# Patient Record
Sex: FEMALE | Race: BLACK OR AFRICAN AMERICAN | Employment: FULL TIME | URBAN - METROPOLITAN AREA
[De-identification: names, ages, dates, MRNs, and addresses within clinical notes are randomized per-mention and may not be internally consistent; named-entity substitution may affect disease eponyms.]

---

## 2017-06-06 ENCOUNTER — OFFICE VISIT (OUTPATIENT)
Dept: FAMILY MEDICINE CLINIC | Age: 32
End: 2017-06-06

## 2017-06-06 VITALS
SYSTOLIC BLOOD PRESSURE: 126 MMHG | OXYGEN SATURATION: 98 % | BODY MASS INDEX: 41.3 KG/M2 | WEIGHT: 257 LBS | DIASTOLIC BLOOD PRESSURE: 78 MMHG | HEART RATE: 99 BPM | HEIGHT: 66 IN

## 2017-06-06 DIAGNOSIS — K27.9 PEPTIC ULCER DISEASE: ICD-10-CM

## 2017-06-06 DIAGNOSIS — M25.561 ACUTE PAIN OF RIGHT KNEE: ICD-10-CM

## 2017-06-06 DIAGNOSIS — I10 ESSENTIAL HYPERTENSION: ICD-10-CM

## 2017-06-06 DIAGNOSIS — Z13.31 POSITIVE DEPRESSION SCREENING: ICD-10-CM

## 2017-06-06 DIAGNOSIS — F31.9 BIPOLAR 1 DISORDER (HCC): Primary | ICD-10-CM

## 2017-06-06 PROBLEM — C80.1 CANCER (HCC): Status: RESOLVED | Noted: 2017-06-06 | Resolved: 2017-06-06

## 2017-06-06 PROBLEM — J45.909 ASTHMA: Status: ACTIVE | Noted: 2017-06-06

## 2017-06-06 PROCEDURE — 99203 OFFICE O/P NEW LOW 30 MIN: CPT | Performed by: FAMILY MEDICINE

## 2017-06-06 PROCEDURE — 96160 PT-FOCUSED HLTH RISK ASSMT: CPT | Performed by: FAMILY MEDICINE

## 2017-06-06 PROCEDURE — G8431 POS CLIN DEPRES SCRN F/U DOC: HCPCS | Performed by: FAMILY MEDICINE

## 2017-06-06 RX ORDER — HYDROXYZINE HYDROCHLORIDE 25 MG/1
25 TABLET, FILM COATED ORAL 2 TIMES DAILY
COMMUNITY
End: 2017-06-06 | Stop reason: SDUPTHER

## 2017-06-06 RX ORDER — HYDROCHLOROTHIAZIDE 25 MG/1
25 TABLET ORAL DAILY
Qty: 30 TABLET | Refills: 3 | Status: SHIPPED | OUTPATIENT
Start: 2017-06-06 | End: 2017-10-11

## 2017-06-06 RX ORDER — HYDROXYZINE HYDROCHLORIDE 25 MG/1
25 TABLET, FILM COATED ORAL 2 TIMES DAILY
Qty: 60 TABLET | Refills: 3 | Status: SHIPPED | OUTPATIENT
Start: 2017-06-06 | End: 2017-10-11

## 2017-06-06 RX ORDER — TRAZODONE HYDROCHLORIDE 50 MG/1
50 TABLET ORAL NIGHTLY
Qty: 30 TABLET | Refills: 3 | Status: SHIPPED | OUTPATIENT
Start: 2017-06-06 | End: 2017-10-11

## 2017-06-06 RX ORDER — TRAMADOL HYDROCHLORIDE 50 MG/1
50 TABLET ORAL EVERY 6 HOURS PRN
Qty: 40 TABLET | Refills: 0 | Status: SHIPPED | OUTPATIENT
Start: 2017-06-06 | End: 2017-06-16

## 2017-06-06 RX ORDER — DULOXETIN HYDROCHLORIDE 60 MG/1
60 CAPSULE, DELAYED RELEASE ORAL DAILY
Qty: 30 CAPSULE | Refills: 3 | Status: SHIPPED | OUTPATIENT
Start: 2017-06-06 | End: 2017-10-11

## 2017-06-06 RX ORDER — PANTOPRAZOLE SODIUM 40 MG/1
40 TABLET, DELAYED RELEASE ORAL DAILY
Qty: 30 TABLET | Refills: 3 | Status: SHIPPED | OUTPATIENT
Start: 2017-06-06 | End: 2017-10-11

## 2017-06-06 RX ORDER — DIVALPROEX SODIUM 500 MG/1
500 TABLET, DELAYED RELEASE ORAL 3 TIMES DAILY
Qty: 90 TABLET | Refills: 3 | Status: SHIPPED | OUTPATIENT
Start: 2017-06-06 | End: 2017-10-11

## 2017-06-06 RX ORDER — DIVALPROEX SODIUM 250 MG/1
250 TABLET, DELAYED RELEASE ORAL 3 TIMES DAILY
Qty: 90 TABLET | Refills: 3 | Status: SHIPPED | OUTPATIENT
Start: 2017-06-06 | End: 2017-10-11

## 2017-06-06 ASSESSMENT — ENCOUNTER SYMPTOMS
VOMITING: 0
COUGH: 0
SINUS PRESSURE: 0
SHORTNESS OF BREATH: 0
ABDOMINAL PAIN: 0
BACK PAIN: 0
SORE THROAT: 0
VISUAL CHANGE: 0
NAUSEA: 0
BLOOD IN STOOL: 0
DIARRHEA: 0
EYE DISCHARGE: 0

## 2017-06-06 ASSESSMENT — PATIENT HEALTH QUESTIONNAIRE - PHQ9
4. FEELING TIRED OR HAVING LITTLE ENERGY: 0
7. TROUBLE CONCENTRATING ON THINGS, SUCH AS READING THE NEWSPAPER OR WATCHING TELEVISION: 3
3. TROUBLE FALLING OR STAYING ASLEEP: 3
9. THOUGHTS THAT YOU WOULD BE BETTER OFF DEAD, OR OF HURTING YOURSELF: 0
5. POOR APPETITE OR OVEREATING: 3
8. MOVING OR SPEAKING SO SLOWLY THAT OTHER PEOPLE COULD HAVE NOTICED. OR THE OPPOSITE, BEING SO FIGETY OR RESTLESS THAT YOU HAVE BEEN MOVING AROUND A LOT MORE THAN USUAL: 3
SUM OF ALL RESPONSES TO PHQ QUESTIONS 1-9: 21
6. FEELING BAD ABOUT YOURSELF - OR THAT YOU ARE A FAILURE OR HAVE LET YOURSELF OR YOUR FAMILY DOWN: 3
SUM OF ALL RESPONSES TO PHQ9 QUESTIONS 1 & 2: 6
1. LITTLE INTEREST OR PLEASURE IN DOING THINGS: 3
10. IF YOU CHECKED OFF ANY PROBLEMS, HOW DIFFICULT HAVE THESE PROBLEMS MADE IT FOR YOU TO DO YOUR WORK, TAKE CARE OF THINGS AT HOME, OR GET ALONG WITH OTHER PEOPLE: 3
2. FEELING DOWN, DEPRESSED OR HOPELESS: 3

## 2017-06-30 ENCOUNTER — TELEPHONE (OUTPATIENT)
Dept: FAMILY MEDICINE CLINIC | Age: 32
End: 2017-06-30

## 2017-10-11 ENCOUNTER — OFFICE VISIT (OUTPATIENT)
Dept: FAMILY MEDICINE CLINIC | Age: 32
End: 2017-10-11

## 2017-10-11 VITALS
OXYGEN SATURATION: 97 % | HEIGHT: 65 IN | BODY MASS INDEX: 44.52 KG/M2 | WEIGHT: 267.2 LBS | HEART RATE: 99 BPM | DIASTOLIC BLOOD PRESSURE: 68 MMHG | SYSTOLIC BLOOD PRESSURE: 106 MMHG

## 2017-10-11 DIAGNOSIS — B86 SCABIES: Primary | ICD-10-CM

## 2017-10-11 DIAGNOSIS — K64.9 HEMORRHOIDS, UNSPECIFIED HEMORRHOID TYPE: ICD-10-CM

## 2017-10-11 PROCEDURE — 99213 OFFICE O/P EST LOW 20 MIN: CPT | Performed by: NURSE PRACTITIONER

## 2017-10-11 RX ORDER — IVERMECTIN 3 MG/1
24 TABLET ORAL ONCE
Qty: 8 TABLET | Refills: 1 | Status: SHIPPED | OUTPATIENT
Start: 2017-10-11 | End: 2017-10-11

## 2017-10-11 ASSESSMENT — ENCOUNTER SYMPTOMS
RECTAL PAIN: 1
RESPIRATORY NEGATIVE: 1

## 2017-10-11 NOTE — PROGRESS NOTES
Jessie Hickey is a 27 y.o. female. Chief Complaint   Patient presents with    Rash     x 2 weeks, has been exposed to scabies, worse on creases of arms, face, arms, neck and worse at night. SUBJECTIVE:     MANUELA Delgado presents with pruritus x 2 wks. She is also experiencing hemorrhoids. She was exposed to scabies at her work ~1 month ago. Over the last 2 weeks she reports generalized pruritus that is more intense during the night. The following areas are most bothersome: scalp line, breasts, areolae, waistline, antecubital fossae, wrists, finger webs. She denies new shampoos, soaps, detergents, foods, medications. She is feeling well otherwise. Reports hx of colon CA in 2013 s/p colon resection. Since that time she's had intermittent hemorrhoids. Over the past month she's had worsening pain from her hemorrhoids despite using witch hazel and prep H otc. Requesting referral.    Review of Systems   Constitutional: Negative. HENT: Negative. Respiratory: Negative. Cardiovascular: Negative. Gastrointestinal: Positive for rectal pain (hemorrhoids). Genitourinary: Negative. Musculoskeletal: Negative. Skin: Positive for rash (+ itching). Allergic/Immunologic: Negative for immunocompromised state. Neurological: Negative. Psychiatric/Behavioral: Negative. OBJECTIVE:      /68   Pulse 99   Ht 5' 4.75\" (1.645 m)   Wt 267 lb 3.2 oz (121.2 kg)   LMP 09/13/2017 (Exact Date)   SpO2 97%   Breastfeeding? No   BMI 44.81 kg/m²       Physical Exam   Constitutional: She is oriented to person, place, and time. She appears well-developed and well-nourished. No distress. HENT:   Head: Normocephalic and atraumatic. Pulmonary/Chest: Effort normal.   Musculoskeletal: Normal range of motion. Neurological: She is alert and oriented to person, place, and time. Skin: She is not diaphoretic. Psychiatric: She has a normal mood and affect.  Her behavior is normal. and call your doctor if you are having problems. It's also a good idea to know your test results and keep a list of the medicines you take. How can you care for yourself at home? · Use the lotion or cream your doctor recommends or prescribes. One treatment usually cures scabies. Do not use the cream again unless your doctor tells you to. · Wash all clothes, bedding, and towels that you used in the 3 days before you started treatment. Use hot water, and use the hot cycle in the dryer. Another option is to dry-clean these items. Or seal them in a plastic bag for 3 to 7 days. · Take an oral antihistamine, such as loratadine (Claritin) or diphenhydramine (Benadryl), to help stop itching. You also can use a nonprescription anti-itch cream. Read and follow all instructions on the label. · Do not have physical contact with other people or let anyone use your personal items until you have finished treatment. Do not use other people's personal items until your treatment is done. Tell people with whom you have close contact that they will need treatment if they have symptoms. · Take an oatmeal bath to help relieve itching. Add a handful of oatmeal (ground to a powder) to your bath. Or you can try an oatmeal bath product, such as Aveeno. When should you call for help? Call your doctor now or seek immediate medical care if:  · You have signs of infection, such as:  ¨ Increased pain, swelling, warmth, or redness. ¨ Red streaks leading from the mite bites. ¨ Pus draining from a bite area. ¨ A fever. Watch closely for changes in your health, and be sure to contact your doctor if:  · Anyone else in your family has itching. · You do not get better within 2 weeks. Where can you learn more? Go to [email or web address] and sign in to your [employer] account. Enter P737 in the [employer] box to learn more about \"Scabies: Care Instructions. \"     If you do not have an account, please click on

## 2017-10-13 ENCOUNTER — TELEPHONE (OUTPATIENT)
Dept: FAMILY MEDICINE CLINIC | Age: 32
End: 2017-10-13

## 2017-10-13 DIAGNOSIS — B86 SCABIES: Primary | ICD-10-CM

## 2017-10-16 PROBLEM — B86 SCABIES: Status: ACTIVE | Noted: 2017-10-16

## 2017-10-16 RX ORDER — HYDROXYZINE HYDROCHLORIDE 25 MG/1
25 TABLET, FILM COATED ORAL EVERY 6 HOURS PRN
Qty: 20 TABLET | Refills: 0 | Status: SHIPPED | OUTPATIENT
Start: 2017-10-16 | End: 2017-10-26

## 2017-10-20 ENCOUNTER — OFFICE VISIT (OUTPATIENT)
Dept: FAMILY MEDICINE CLINIC | Age: 32
End: 2017-10-20

## 2017-10-20 VITALS
BODY MASS INDEX: 42.2 KG/M2 | HEIGHT: 66 IN | SYSTOLIC BLOOD PRESSURE: 118 MMHG | OXYGEN SATURATION: 98 % | HEART RATE: 80 BPM | WEIGHT: 262.6 LBS | DIASTOLIC BLOOD PRESSURE: 82 MMHG | RESPIRATION RATE: 17 BRPM

## 2017-10-20 DIAGNOSIS — Z13.1 SCREENING FOR DIABETES MELLITUS: ICD-10-CM

## 2017-10-20 DIAGNOSIS — N92.0 MENORRHAGIA WITH REGULAR CYCLE: Primary | ICD-10-CM

## 2017-10-20 DIAGNOSIS — Z13.220 SCREENING FOR LIPID DISORDERS: ICD-10-CM

## 2017-10-20 DIAGNOSIS — Z13.29 SCREENING FOR THYROID DISORDER: ICD-10-CM

## 2017-10-20 DIAGNOSIS — Z23 NEED FOR PROPHYLACTIC VACCINATION AGAINST DIPHTHERIA-TETANUS-PERTUSSIS (DTP): ICD-10-CM

## 2017-10-20 DIAGNOSIS — Z23 NEED FOR PROPHYLACTIC VACCINATION AGAINST STREPTOCOCCUS PNEUMONIAE (PNEUMOCOCCUS): ICD-10-CM

## 2017-10-20 DIAGNOSIS — Z85.038 HISTORY OF COLON CANCER: ICD-10-CM

## 2017-10-20 DIAGNOSIS — Z13.0 SCREENING FOR DEFICIENCY ANEMIA: ICD-10-CM

## 2017-10-20 PROCEDURE — G8484 FLU IMMUNIZE NO ADMIN: HCPCS | Performed by: NURSE PRACTITIONER

## 2017-10-20 PROCEDURE — G8417 CALC BMI ABV UP PARAM F/U: HCPCS | Performed by: NURSE PRACTITIONER

## 2017-10-20 PROCEDURE — 90472 IMMUNIZATION ADMIN EACH ADD: CPT | Performed by: NURSE PRACTITIONER

## 2017-10-20 PROCEDURE — 1036F TOBACCO NON-USER: CPT | Performed by: NURSE PRACTITIONER

## 2017-10-20 PROCEDURE — 90715 TDAP VACCINE 7 YRS/> IM: CPT | Performed by: NURSE PRACTITIONER

## 2017-10-20 PROCEDURE — 90471 IMMUNIZATION ADMIN: CPT | Performed by: NURSE PRACTITIONER

## 2017-10-20 PROCEDURE — 99215 OFFICE O/P EST HI 40 MIN: CPT | Performed by: NURSE PRACTITIONER

## 2017-10-20 PROCEDURE — 90732 PPSV23 VACC 2 YRS+ SUBQ/IM: CPT | Performed by: NURSE PRACTITIONER

## 2017-10-20 PROCEDURE — G8427 DOCREV CUR MEDS BY ELIG CLIN: HCPCS | Performed by: NURSE PRACTITIONER

## 2017-10-20 RX ORDER — DIVALPROEX SODIUM 500 MG/1
TABLET, DELAYED RELEASE ORAL
Refills: 0 | COMMUNITY
Start: 2017-10-16 | End: 2017-10-20 | Stop reason: SDUPTHER

## 2017-10-20 RX ORDER — IVERMECTIN 3 MG/1
TABLET ORAL
Refills: 0 | COMMUNITY
Start: 2017-10-16 | End: 2017-10-20 | Stop reason: ALTCHOICE

## 2017-10-20 RX ORDER — DULOXETIN HYDROCHLORIDE 60 MG/1
CAPSULE, DELAYED RELEASE ORAL
Qty: 30 CAPSULE | Refills: 5 | Status: SHIPPED | OUTPATIENT
Start: 2017-10-20 | End: 2017-10-20 | Stop reason: CLARIF

## 2017-10-20 RX ORDER — HYDROCHLOROTHIAZIDE 25 MG/1
TABLET ORAL
Refills: 0 | COMMUNITY
Start: 2017-10-16 | End: 2017-10-20 | Stop reason: SDUPTHER

## 2017-10-20 RX ORDER — LURASIDONE HYDROCHLORIDE 40 MG/1
40 TABLET, FILM COATED ORAL DAILY
Qty: 30 TABLET | Refills: 5 | Status: SHIPPED | OUTPATIENT
Start: 2017-10-20 | End: 2017-10-20 | Stop reason: CLARIF

## 2017-10-20 RX ORDER — ALBUTEROL SULFATE 90 UG/1
2 AEROSOL, METERED RESPIRATORY (INHALATION) EVERY 6 HOURS PRN
Qty: 1 INHALER | Refills: 3 | Status: SHIPPED | OUTPATIENT
Start: 2017-10-20

## 2017-10-20 RX ORDER — LURASIDONE HYDROCHLORIDE 40 MG/1
TABLET, FILM COATED ORAL
COMMUNITY
Start: 2017-10-19 | End: 2017-10-20 | Stop reason: SDUPTHER

## 2017-10-20 RX ORDER — PANTOPRAZOLE SODIUM 40 MG/1
TABLET, DELAYED RELEASE ORAL
Refills: 0 | COMMUNITY
Start: 2017-10-16 | End: 2017-10-20 | Stop reason: SDUPTHER

## 2017-10-20 RX ORDER — DULOXETIN HYDROCHLORIDE 60 MG/1
CAPSULE, DELAYED RELEASE ORAL
Refills: 0 | COMMUNITY
Start: 2017-10-16 | End: 2017-10-20 | Stop reason: SDUPTHER

## 2017-10-20 RX ORDER — HYDROCHLOROTHIAZIDE 25 MG/1
TABLET ORAL
Qty: 30 TABLET | Refills: 5 | Status: SHIPPED | OUTPATIENT
Start: 2017-10-20 | End: 2022-02-02

## 2017-10-20 RX ORDER — DIVALPROEX SODIUM 500 MG/1
TABLET, DELAYED RELEASE ORAL
Qty: 90 TABLET | Refills: 5 | Status: SHIPPED | OUTPATIENT
Start: 2017-10-20 | End: 2017-10-20 | Stop reason: CLARIF

## 2017-10-20 RX ORDER — PANTOPRAZOLE SODIUM 40 MG/1
TABLET, DELAYED RELEASE ORAL
Qty: 30 TABLET | Refills: 5 | Status: SHIPPED | OUTPATIENT
Start: 2017-10-20 | End: 2022-02-02

## 2017-10-20 NOTE — PROGRESS NOTES
SUBJECTIVE:  Jessie Hickey   1985   female   No Known Allergies    Chief Complaint   Patient presents with    [facility]      HPI   Here to [facility] PCP  Moved here from [place]  Reports that she has a history of colon cancer. 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    Hypertension     Kidney stone     Peptic ulcer disease      Social History     Social History    Marital status: Single     Spouse name: N/A    Number of children: 3    Years of education: 15     Occupational History    [facility]; [place]     Social History Main Topics    Smoking status: Passive Smoke Exposure - Never Smoker    Smokeless tobacco: Never Used    Alcohol use Yes      Comment: coffee 1/day; ETOH oncre monthly    Drug use: No    Sexual activity: Not Currently     Other Topics Concern    Not on file     Social History Narrative    No history of family violence. Yes, she feels safe in her home. No gun in the home.           Family History   Problem Relation Age of Onset    Arthritis Mother     Asthma Mother     Heart Failure Mother     High Cholesterol Mother     High Blood Pressure Mother     Migraines Mother     Cancer Mother      possible lung but not sure    Heart Attack Mother     Arthritis Maternal Grandmother     High Blood Pressure Maternal Grandmother     Breast Cancer Maternal Grandmother     Cancer Maternal Grandmother      brest, lung and bone    Schizophrenia Sister     Bipolar Disorder Daughter     Asthma Daughter     Asthma Son     ADHD Son      oppositional defiant disorder    Asthma Son     No Known Problems Grandchild     Diabetes Neg Hx     Stroke Neg Hx      Past Surgical History:   Procedure

## 2017-10-22 ASSESSMENT — ENCOUNTER SYMPTOMS
NAUSEA: 0
SHORTNESS OF BREATH: 0
ABDOMINAL DISTENTION: 0
VOMITING: 0
BACK PAIN: 0

## 2017-10-25 ENCOUNTER — NURSE ONLY (OUTPATIENT)
Dept: FAMILY MEDICINE CLINIC | Age: 32
End: 2017-10-25

## 2017-10-25 DIAGNOSIS — Z13.0 SCREENING FOR DEFICIENCY ANEMIA: ICD-10-CM

## 2017-10-25 DIAGNOSIS — Z13.1 SCREENING FOR DIABETES MELLITUS: ICD-10-CM

## 2017-10-25 DIAGNOSIS — Z13.29 SCREENING FOR THYROID DISORDER: ICD-10-CM

## 2017-10-25 DIAGNOSIS — Z13.220 SCREENING FOR LIPID DISORDERS: ICD-10-CM

## 2017-10-25 LAB
A/G RATIO: 1 (ref 1.1–2.2)
ALBUMIN SERPL-MCNC: 3.7 G/DL (ref 3.4–5)
ALP BLD-CCNC: 51 U/L (ref 40–129)
ALT SERPL-CCNC: 12 U/L (ref 10–40)
ANION GAP SERPL CALCULATED.3IONS-SCNC: 10 MMOL/L (ref 3–16)
AST SERPL-CCNC: 16 U/L (ref 15–37)
BILIRUB SERPL-MCNC: <0.2 MG/DL (ref 0–1)
BUN BLDV-MCNC: 9 MG/DL (ref 7–20)
CALCIUM SERPL-MCNC: 9.5 MG/DL (ref 8.3–10.6)
CHLORIDE BLD-SCNC: 103 MMOL/L (ref 99–110)
CHOLESTEROL, TOTAL: 167 MG/DL (ref 0–199)
CO2: 28 MMOL/L (ref 21–32)
CREAT SERPL-MCNC: 0.7 MG/DL (ref 0.6–1.1)
GFR AFRICAN AMERICAN: >60
GFR NON-AFRICAN AMERICAN: >60
GLOBULIN: 3.8 G/DL
GLUCOSE BLD-MCNC: 83 MG/DL (ref 70–99)
HCT VFR BLD CALC: 34.3 % (ref 36–48)
HDLC SERPL-MCNC: 52 MG/DL (ref 40–60)
HEMOGLOBIN: 10.7 G/DL (ref 12–16)
LDL CHOLESTEROL CALCULATED: 91 MG/DL
MCH RBC QN AUTO: 28.9 PG (ref 26–34)
MCHC RBC AUTO-ENTMCNC: 31.2 G/DL (ref 31–36)
MCV RBC AUTO: 92.5 FL (ref 80–100)
PDW BLD-RTO: 17.5 % (ref 12.4–15.4)
PLATELET # BLD: 322 K/UL (ref 135–450)
PMV BLD AUTO: 7 FL (ref 5–10.5)
POTASSIUM SERPL-SCNC: 4.8 MMOL/L (ref 3.5–5.1)
RBC # BLD: 3.71 M/UL (ref 4–5.2)
SODIUM BLD-SCNC: 141 MMOL/L (ref 136–145)
TOTAL PROTEIN: 7.5 G/DL (ref 6.4–8.2)
TRIGL SERPL-MCNC: 118 MG/DL (ref 0–150)
TSH REFLEX FT4: 2.38 UIU/ML (ref 0.27–4.2)
VLDLC SERPL CALC-MCNC: 24 MG/DL
WBC # BLD: 3.9 K/UL (ref 4–11)

## 2017-10-25 PROCEDURE — 36415 COLL VENOUS BLD VENIPUNCTURE: CPT | Performed by: NURSE PRACTITIONER

## 2017-10-26 RX ORDER — LANOLIN ALCOHOL/MO/W.PET/CERES
325 CREAM (GRAM) TOPICAL 2 TIMES DAILY
Qty: 60 TABLET | Refills: 2 | Status: SHIPPED | OUTPATIENT
Start: 2017-10-26 | End: 2022-02-02

## 2017-10-31 ENCOUNTER — HOSPITAL ENCOUNTER (OUTPATIENT)
Dept: ULTRASOUND IMAGING | Age: 32
Discharge: HOME | End: 2017-10-31
Attending: INTERNAL MEDICINE | Admitting: INTERNAL MEDICINE

## 2017-10-31 DIAGNOSIS — R10.11 ABDOMINAL PAIN, RIGHT UPPER QUADRANT: ICD-10-CM

## 2017-10-31 DIAGNOSIS — R10.31 ABDOMINAL PAIN, RIGHT LOWER QUADRANT: ICD-10-CM

## 2018-01-18 ENCOUNTER — HOSPITAL ENCOUNTER (OUTPATIENT)
Dept: OTHER | Age: 33
Discharge: HOME | End: 2018-01-18
Attending: OBSTETRICS & GYNECOLOGY | Admitting: OBSTETRICS & GYNECOLOGY

## 2018-03-19 ENCOUNTER — TELEPHONE (OUTPATIENT)
Dept: FAMILY MEDICINE CLINIC | Age: 33
End: 2018-03-19

## 2018-03-19 NOTE — TELEPHONE ENCOUNTER
Called patient at home/Cell,  no answer LM on Vm requesting patient call the office to reschedule this appt

## 2019-02-27 ENCOUNTER — TELEPHONE (OUTPATIENT)
Dept: ORTHOPEDIC SURGERY | Age: 34
End: 2019-02-27

## 2020-03-03 ENCOUNTER — TELEPHONE (OUTPATIENT)
Dept: FAMILY MEDICINE CLINIC | Age: 35
End: 2020-03-03

## 2021-01-20 ENCOUNTER — HOSPITAL ENCOUNTER (OUTPATIENT)
Dept: INFUSION THERAPY | Age: 36
Discharge: HOME | End: 2021-01-20
Payer: COMMERCIAL

## 2021-01-20 ENCOUNTER — INITIAL CONSULT (OUTPATIENT)
Dept: ONCOLOGY | Age: 36
End: 2021-01-20
Payer: COMMERCIAL

## 2021-01-20 VITALS
TEMPERATURE: 97.7 F | HEIGHT: 65 IN | DIASTOLIC BLOOD PRESSURE: 90 MMHG | SYSTOLIC BLOOD PRESSURE: 136 MMHG | WEIGHT: 271 LBS | BODY MASS INDEX: 45.15 KG/M2 | OXYGEN SATURATION: 98 % | RESPIRATION RATE: 16 BRPM | HEART RATE: 96 BPM

## 2021-01-20 DIAGNOSIS — D3A.020 CARCINOID TUMOR OF APPENDIX, UNSPECIFIED WHETHER MALIGNANT: ICD-10-CM

## 2021-01-20 DIAGNOSIS — D3A.020 CARCINOID TUMOR OF APPENDIX, UNSPECIFIED WHETHER MALIGNANT: Primary | ICD-10-CM

## 2021-01-20 LAB
ALBUMIN SERPL-MCNC: 4.3 GM/DL (ref 3.4–5)
ALP BLD-CCNC: 62 IU/L (ref 40–128)
ALT SERPL-CCNC: 22 U/L (ref 10–40)
ANION GAP SERPL CALCULATED.3IONS-SCNC: 9 MMOL/L (ref 4–16)
AST SERPL-CCNC: 22 IU/L (ref 15–37)
BASOPHILS ABSOLUTE: 0 K/CU MM
BASOPHILS RELATIVE PERCENT: 0.2 % (ref 0–1)
BILIRUB SERPL-MCNC: 0.2 MG/DL (ref 0–1)
BUN BLDV-MCNC: 11 MG/DL (ref 6–23)
CALCIUM SERPL-MCNC: 9.1 MG/DL (ref 8.3–10.6)
CHLORIDE BLD-SCNC: 106 MMOL/L (ref 99–110)
CO2: 24 MMOL/L (ref 21–32)
CREAT SERPL-MCNC: 0.7 MG/DL (ref 0.6–1.1)
DIFFERENTIAL TYPE: ABNORMAL
EOSINOPHILS ABSOLUTE: 0.2 K/CU MM
EOSINOPHILS RELATIVE PERCENT: 3.8 % (ref 0–3)
GFR AFRICAN AMERICAN: >60 ML/MIN/1.73M2
GFR NON-AFRICAN AMERICAN: >60 ML/MIN/1.73M2
GLUCOSE BLD-MCNC: 93 MG/DL (ref 70–99)
HCT VFR BLD CALC: 37.1 % (ref 37–47)
HEMOGLOBIN: 12.5 GM/DL (ref 12.5–16)
LYMPHOCYTES ABSOLUTE: 2 K/CU MM
LYMPHOCYTES RELATIVE PERCENT: 38 % (ref 24–44)
MCH RBC QN AUTO: 30.4 PG (ref 27–31)
MCHC RBC AUTO-ENTMCNC: 33.7 % (ref 32–36)
MCV RBC AUTO: 90.3 FL (ref 78–100)
MONOCYTES ABSOLUTE: 0.5 K/CU MM
MONOCYTES RELATIVE PERCENT: 8.9 % (ref 0–4)
PDW BLD-RTO: 13 % (ref 11.7–14.9)
PLATELET # BLD: 277 K/CU MM (ref 140–440)
PMV BLD AUTO: 8.3 FL (ref 7.5–11.1)
POTASSIUM SERPL-SCNC: 4.1 MMOL/L (ref 3.5–5.1)
RBC # BLD: 4.11 M/CU MM (ref 4.2–5.4)
SEGMENTED NEUTROPHILS ABSOLUTE COUNT: 2.6 K/CU MM
SEGMENTED NEUTROPHILS RELATIVE PERCENT: 49.1 % (ref 36–66)
SODIUM BLD-SCNC: 139 MMOL/L (ref 135–145)
TOTAL PROTEIN: 7.6 GM/DL (ref 6.4–8.2)
WBC # BLD: 5.3 K/CU MM (ref 4–10.5)

## 2021-01-20 PROCEDURE — G8427 DOCREV CUR MEDS BY ELIG CLIN: HCPCS | Performed by: INTERNAL MEDICINE

## 2021-01-20 PROCEDURE — 85025 COMPLETE CBC W/AUTO DIFF WBC: CPT

## 2021-01-20 PROCEDURE — 80053 COMPREHEN METABOLIC PANEL: CPT

## 2021-01-20 PROCEDURE — G8417 CALC BMI ABV UP PARAM F/U: HCPCS | Performed by: INTERNAL MEDICINE

## 2021-01-20 PROCEDURE — 1036F TOBACCO NON-USER: CPT | Performed by: INTERNAL MEDICINE

## 2021-01-20 PROCEDURE — 86316 IMMUNOASSAY TUMOR OTHER: CPT

## 2021-01-20 PROCEDURE — 99202 OFFICE O/P NEW SF 15 MIN: CPT

## 2021-01-20 PROCEDURE — 36415 COLL VENOUS BLD VENIPUNCTURE: CPT

## 2021-01-20 PROCEDURE — G8484 FLU IMMUNIZE NO ADMIN: HCPCS | Performed by: INTERNAL MEDICINE

## 2021-01-20 PROCEDURE — 99204 OFFICE O/P NEW MOD 45 MIN: CPT | Performed by: INTERNAL MEDICINE

## 2021-01-20 RX ORDER — PROPRANOLOL HYDROCHLORIDE 20 MG/1
20 TABLET ORAL 2 TIMES DAILY
COMMUNITY
Start: 2021-01-05 | End: 2022-02-23

## 2021-01-20 RX ORDER — IBUPROFEN 600 MG/1
TABLET ORAL
COMMUNITY
End: 2022-02-23

## 2021-01-20 RX ORDER — HYDROXYZINE HYDROCHLORIDE 25 MG/1
TABLET, FILM COATED ORAL
COMMUNITY
Start: 2020-10-21

## 2021-01-20 RX ORDER — PENICILLIN V POTASSIUM 500 MG/1
TABLET ORAL
COMMUNITY
End: 2022-02-02

## 2021-01-20 RX ORDER — AZITHROMYCIN 500 MG/1
TABLET, FILM COATED ORAL
COMMUNITY
End: 2022-02-02

## 2021-01-20 RX ORDER — NITROFURANTOIN 25; 75 MG/1; MG/1
CAPSULE ORAL
COMMUNITY
End: 2022-02-02

## 2021-01-20 RX ORDER — TOLTERODINE 2 MG/1
2 CAPSULE, EXTENDED RELEASE ORAL DAILY
COMMUNITY
Start: 2020-06-19 | End: 2022-02-02

## 2021-01-20 RX ORDER — CEFUROXIME AXETIL 500 MG/1
TABLET ORAL
COMMUNITY
Start: 2020-06-26 | End: 2022-02-02

## 2021-01-20 RX ORDER — ARIPIPRAZOLE 2 MG/1
TABLET ORAL
COMMUNITY
End: 2022-02-02

## 2021-01-20 RX ORDER — SUMATRIPTAN 25 MG/1
TABLET, FILM COATED ORAL
COMMUNITY
Start: 2020-10-17 | End: 2022-02-02

## 2021-01-20 RX ORDER — PREDNISONE 20 MG/1
TABLET ORAL
COMMUNITY
Start: 2020-06-26 | End: 2022-02-23

## 2021-01-20 RX ORDER — AZITHROMYCIN 250 MG/1
TABLET, FILM COATED ORAL
COMMUNITY
End: 2022-02-02

## 2021-01-20 RX ORDER — TRAZODONE HYDROCHLORIDE 50 MG/1
TABLET ORAL
COMMUNITY
Start: 2021-01-05 | End: 2022-02-02

## 2021-01-20 RX ORDER — ACETAMINOPHEN AND CODEINE PHOSPHATE 300; 30 MG/1; MG/1
TABLET ORAL
COMMUNITY
End: 2022-02-02

## 2021-01-20 RX ORDER — SULFAMETHOXAZOLE AND TRIMETHOPRIM 800; 160 MG/1; MG/1
TABLET ORAL
COMMUNITY
End: 2022-02-02

## 2021-01-20 RX ORDER — LAMOTRIGINE 100 MG/1
TABLET ORAL
COMMUNITY

## 2021-01-20 RX ORDER — BISMUTH SUBCITRATE POTASSIUM, METRONIDAZOLE, TETRACYCLINE HYDROCHLORIDE 140; 125; 125 MG/1; MG/1; MG/1
CAPSULE ORAL
COMMUNITY
Start: 2021-01-13 | End: 2022-02-02

## 2021-01-20 RX ORDER — METRONIDAZOLE 500 MG/1
TABLET ORAL
COMMUNITY
End: 2022-02-02

## 2021-01-20 RX ORDER — AMLODIPINE BESYLATE 5 MG/1
TABLET ORAL
COMMUNITY
Start: 2020-05-26

## 2021-01-20 RX ORDER — FLUCONAZOLE 150 MG/1
TABLET ORAL
COMMUNITY
End: 2022-02-23

## 2021-01-20 RX ORDER — HYDROCODONE BITARTRATE AND ACETAMINOPHEN 7.5; 325 MG/1; MG/1
TABLET ORAL
COMMUNITY
End: 2022-02-02

## 2021-01-20 RX ORDER — CLONAZEPAM 0.5 MG/1
0.5 TABLET ORAL NIGHTLY
COMMUNITY
Start: 2020-11-01 | End: 2022-02-02

## 2021-01-20 RX ORDER — CLARITHROMYCIN 500 MG/1
TABLET, COATED ORAL
COMMUNITY
End: 2022-02-02

## 2021-01-20 RX ORDER — ZOLPIDEM TARTRATE 5 MG/1
TABLET ORAL
COMMUNITY
Start: 2020-12-01 | End: 2022-02-02

## 2021-01-20 RX ORDER — OXYBUTYNIN CHLORIDE 5 MG/1
5 TABLET ORAL EVERY 8 HOURS PRN
COMMUNITY
Start: 2020-06-22 | End: 2022-02-02

## 2021-01-20 ASSESSMENT — PATIENT HEALTH QUESTIONNAIRE - PHQ9
7. TROUBLE CONCENTRATING ON THINGS, SUCH AS READING THE NEWSPAPER OR WATCHING TELEVISION: 2
SUM OF ALL RESPONSES TO PHQ QUESTIONS 1-9: 16
SUM OF ALL RESPONSES TO PHQ QUESTIONS 1-9: 16
2. FEELING DOWN, DEPRESSED OR HOPELESS: 2
SUM OF ALL RESPONSES TO PHQ9 QUESTIONS 1 & 2: 3
8. MOVING OR SPEAKING SO SLOWLY THAT OTHER PEOPLE COULD HAVE NOTICED. OR THE OPPOSITE, BEING SO FIGETY OR RESTLESS THAT YOU HAVE BEEN MOVING AROUND A LOT MORE THAN USUAL: 0
1. LITTLE INTEREST OR PLEASURE IN DOING THINGS: 1
3. TROUBLE FALLING OR STAYING ASLEEP: 3
5. POOR APPETITE OR OVEREATING: 3
6. FEELING BAD ABOUT YOURSELF - OR THAT YOU ARE A FAILURE OR HAVE LET YOURSELF OR YOUR FAMILY DOWN: 2
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
## 2021-01-20 NOTE — PROGRESS NOTES
MA Rooming Questions  Patient: Almas Freeman  MRN: [id number]    Date: 1/20/2021         New patient      5. Did the patient have a depression screening completed today?  Yes    PHQ-9 Total Score: 16 (1/20/2021  3:38 PM)  Thoughts that you would be better off dead, or of hurting yourself in some way: 0 (1/20/2021  3:38 PM)       PHQ-9 Given to (if applicable):               PHQ-9 Score (if applicable):                     [] Positive     []  Negative              Does question #9 need addressed (if applicable)                     [] Yes    []  No               Kat Zelaya MA

## 2021-01-21 ENCOUNTER — HOSPITAL ENCOUNTER (OUTPATIENT)
Age: 36
Setting detail: SPECIMEN
Discharge: HOME | End: 2021-01-21
Payer: COMMERCIAL

## 2021-01-21 PROCEDURE — 83497 ASSAY OF 5-HIAA: CPT

## 2021-01-21 PROCEDURE — 81050 URINALYSIS VOLUME MEASURE: CPT

## 2021-01-22 LAB
Lab: 24 HRS
VOLUME, (UVOL): 1400 MLS

## 2021-01-24 LAB — CHROMOGRANIN A: 128 NG/ML (ref 0–103)

## 2021-01-25 LAB
5 HIAA URINE (PER GM CREAT): 3 MG/GCR (ref 0–14)
5-HIAA 24 HOUR URINE: 5 MG/D (ref 0–15)
5-HIAA INTERPRETATION: ABNORMAL
5-HIAA URINE: 3.3 MG/L
CREATININE URINE: 116 MG/DL
CREATININE, 24H UR: 1624 MG/D (ref 700–1600)
TIME URINE: 24
VOLUME, (UVOL): 1400

## 2021-02-01 ENCOUNTER — TELEPHONE (OUTPATIENT)
Dept: ONCOLOGY | Age: 36
End: 2021-02-01

## 2021-02-09 ENCOUNTER — TELEPHONE (OUTPATIENT)
Dept: ONCOLOGY | Age: 36
End: 2021-02-09
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
## 2021-02-15 ENCOUNTER — OFFICE VISIT (OUTPATIENT)
Dept: ONCOLOGY | Age: 36
End: 2021-02-15
Payer: COMMERCIAL

## 2021-02-15 DIAGNOSIS — D3A.020 CARCINOID TUMOR OF APPENDIX, UNSPECIFIED WHETHER MALIGNANT: Primary | ICD-10-CM

## 2021-02-15 PROCEDURE — 99422 OL DIG E/M SVC 11-20 MIN: CPT | Performed by: INTERNAL MEDICINE

## 2021-10-11 ENCOUNTER — OFFICE VISIT (OUTPATIENT)
Dept: OBGYN | Age: 36
End: 2021-10-11
Payer: COMMERCIAL

## 2021-10-11 VITALS
WEIGHT: 273 LBS | HEART RATE: 96 BPM | DIASTOLIC BLOOD PRESSURE: 96 MMHG | HEIGHT: 65 IN | SYSTOLIC BLOOD PRESSURE: 130 MMHG | BODY MASS INDEX: 45.48 KG/M2

## 2021-10-11 DIAGNOSIS — Z01.419 WOMEN'S ANNUAL ROUTINE GYNECOLOGICAL EXAMINATION: Primary | ICD-10-CM

## 2021-10-11 DIAGNOSIS — N93.9 VAGINAL BLEEDING: ICD-10-CM

## 2021-10-11 DIAGNOSIS — N61.1 BREAST ABSCESS: ICD-10-CM

## 2021-10-11 PROCEDURE — 99213 OFFICE O/P EST LOW 20 MIN: CPT | Performed by: OBSTETRICS & GYNECOLOGY

## 2021-10-11 PROCEDURE — 99395 PREV VISIT EST AGE 18-39: CPT | Performed by: OBSTETRICS & GYNECOLOGY

## 2021-10-11 RX ORDER — SULFAMETHOXAZOLE AND TRIMETHOPRIM 800; 160 MG/1; MG/1
1 TABLET ORAL 2 TIMES DAILY
Qty: 20 TABLET | Refills: 0 | Status: SHIPPED | OUTPATIENT
Start: 2021-10-11 | End: 2021-10-21

## 2021-10-11 SDOH — ECONOMIC STABILITY: FOOD INSECURITY: WITHIN THE PAST 12 MONTHS, YOU WORRIED THAT YOUR FOOD WOULD RUN OUT BEFORE YOU GOT MONEY TO BUY MORE.: NEVER TRUE

## 2021-10-11 SDOH — ECONOMIC STABILITY: FOOD INSECURITY: WITHIN THE PAST 12 MONTHS, THE FOOD YOU BOUGHT JUST DIDN'T LAST AND YOU DIDN'T HAVE MONEY TO GET MORE.: NEVER TRUE

## 2021-10-11 ASSESSMENT — SOCIAL DETERMINANTS OF HEALTH (SDOH): HOW HARD IS IT FOR YOU TO PAY FOR THE VERY BASICS LIKE FOOD, HOUSING, MEDICAL CARE, AND HEATING?: NOT HARD AT ALL

## 2021-10-11 NOTE — PROGRESS NOTES
10/11/21    Kirill Angel  1985    Chief Complaint   Patient presents with    Gynecologic Exam     pt had hyster, c/o light orange vaginal bleeding on and off x 6 months. left breast, neck and chest pain x 2 wks, mild-sharp, hot flashes and night sweats x 1 yr last pap 2027.  Breast Pain        Kirill Angel is a 43 y.o. female who presents today for evaluation of annual examination. She reported vaginal bleeding when she began working out but it is resolved now. She also reports left breast pain.     Past Medical History:   Diagnosis Date    Anxiety     Asthma     [facility]     Bipolar 2 disorder ([facility]     Breast pain, left     Cancer ([facility]     colon- carcinoid tumor in colon and Appendix    Colon tumor     Ectopic pregnancy     Elective      GERD (gastroesophageal reflux disease)     Hot flashes     Hypertension     IBS (irritable bowel syndrome)     Kidney stone     Kidney stone     Menorrhagia     Migraine     Neck pain     Night sweat     Obesity     Peptic ulcer disease     Trichomonosis     Vaginal bleeding        Past Surgical History:   Procedure Laterality Date    APPENDECTOMY      HYSTERECTOMY, TOTAL ABDOMINAL      KIDNEY STONE SURGERY      OVARY REMOVAL      SALPINGECTOMY      right    SUBTOTAL COLECTOMY      WRIST GANGLION EXCISION         Social History     Tobacco Use    Smoking status: Passive Smoke Exposure - Never Smoker    Smokeless tobacco: Never Used   Vaping Use    Vaping Use: Never assessed   Substance Use Topics    Alcohol use: Yes     Comment: coffee 1/day; ETOH oncre monthly    Drug use: No       Family History   Problem Relation Age of Onset    Arthritis Mother     Asthma Mother     Heart Failure Mother     High Cholesterol Mother     High Blood Pressure Mother     Migraines Mother     Cancer Mother         possible lung but not sure    Heart Attack Mother     Alcohol Abuse Mother    Kathy Frazier hours as needed      tolterodine (DETROL LA) 2 MG extended release capsule Take 2 mg by mouth daily      clonazePAM (KLONOPIN) 0.5 MG tablet Take 0.5 mg by mouth nightly.  lamoTRIgine (LAMICTAL) 100 MG tablet lamotrigine 100 mg tablet      acetaminophen-codeine (TYLENOL #3) 300-30 MG per tablet acetaminophen 300 mg-codeine 30 mg tablet      HYDROcodone-acetaminophen (NORCO) 7.5-325 MG per tablet hydrocodone 7.5 mg-acetaminophen 325 mg tablet      ibuprofen (ADVIL;MOTRIN) 600 MG tablet ibuprofen 600 mg tablet      SUMAtriptan (IMITREX) 25 MG tablet take 1 tablet by mouth twice a day      amLODIPine (NORVASC) 5 MG tablet amlodipine 5 mg tablet      propranolol (INDERAL) 20 MG tablet Take 20 mg by mouth 2 times daily      PYLERA 140-125-125 MG per capsule       ferrous sulfate (FE TABS) 325 (65 Fe) MG EC tablet Take 1 tablet by mouth 2 times daily 60 tablet 2    hydrochlorothiazide (HYDRODIURIL) 25 MG tablet take 1 tablet by mouth once daily 30 tablet 5    pantoprazole (PROTONIX) 40 MG tablet take 1 tablet by mouth once daily 30 tablet 5    albuterol sulfate HFA (PROAIR HFA) 108 (90 Base) MCG/ACT inhaler Inhale 2 puffs into the lungs every 6 hours as needed for Wheezing 1 Inhaler 3    naproxen (NAPROSYN) 250 MG tablet Take 1 tablet by mouth 2 times daily as needed for Pain 20 tablet 0     No current facility-administered medications for this visit. Allergies   Allergen Reactions    Red Dye Rash    Triptans Itching    Allyl Isothiocyanate Hives and Swelling    Levonorgestrel-Ethinyl [facility]        [id number]    Immunization History   Administered Date(s) Administered    Pneumococcal Polysaccharide ([id number]) 10/20/2017    Tdap (Boostrix, Adacel) 10/20/2017       Review of Systems   All other systems reviewed and are negative. BP (!) 130/96   Pulse 96   Ht 5' 5\" (1.651 m)   Wt 273 lb (123.8 kg)   LMP 12/13/2017   BMI 45.43 kg/m²     Physical Exam  Nursing note reviewed.  Exam conducted with a chaperone present. HENT:      Head: Normocephalic. Nose: Nose normal.   Eyes:      Extraocular Movements: Extraocular movements intact. Pulmonary:      Effort: Pulmonary effort is normal.   Abdominal:      General: Abdomen is flat. Palpations: Abdomen is soft. Hernia: There is no hernia in the left inguinal area or right inguinal area. Genitourinary:     General: Normal vulva. Exam position: Lithotomy position. Pubic Area: No rash or pubic lice. Labia:         Right: No rash, tenderness, lesion or injury. Left: No rash, tenderness, lesion or injury. Urethra: No prolapse, urethral pain, urethral swelling or urethral lesion. Vagina: Normal.      Cervix: Normal.      Uterus: Normal.       Adnexa: Right adnexa normal and left adnexa normal.      Rectum: Normal.   Musculoskeletal:      Cervical back: Normal range of motion. Lymphadenopathy:      Lower Body: No right inguinal adenopathy. No left inguinal adenopathy. Skin:     General: Skin is warm. Neurological:      Mental Status: She is alert. No results found for this visit on 10/11/21. ASSESSMENT AND PLAN   Diagnosis Orders   1. Women's annual routine gynecological examination     2. Vaginal bleeding     3. Breast abscess     The patient will monitor her vaginal symptoms. If she has recurrent vaginal bleeding she will return for another appointment. She will use Tylenol and warm compresses to the area of her last left breast.  Bactrim twice daily. She will return in 1 week for repeat examination. If she continues to have symptoms and no improvement we will proceed with breast ultrasound. Return in about 1 week (around 10/18/2021) for follow up appointment.     Patricia Martinez MD

## 2021-10-12 LAB
C TRACH DNA GENITAL QL NAA+PROBE: NEGATIVE
CANDIDA SPECIES, DNA PROBE: ABNORMAL
GARDNERELLA VAGINALIS, DNA PROBE: ABNORMAL
N. GONORRHOEAE DNA: NEGATIVE
TRICHOMONAS VAGINALIS DNA: ABNORMAL

## 2021-10-13 RX ORDER — METRONIDAZOLE 500 MG/1
500 TABLET ORAL 2 TIMES DAILY
Qty: 14 TABLET | Refills: 0 | Status: SHIPPED | OUTPATIENT
Start: 2021-10-13 | End: 2021-10-20

## 2021-10-18 ENCOUNTER — OFFICE VISIT (OUTPATIENT)
Dept: OBGYN | Age: 36
End: 2021-10-18
Payer: COMMERCIAL

## 2021-10-18 VITALS
DIASTOLIC BLOOD PRESSURE: 90 MMHG | HEART RATE: 100 BPM | BODY MASS INDEX: 46.15 KG/M2 | SYSTOLIC BLOOD PRESSURE: 132 MMHG | HEIGHT: 65 IN | WEIGHT: 277 LBS

## 2021-10-18 DIAGNOSIS — N39.46 MIXED INCONTINENCE: ICD-10-CM

## 2021-10-18 DIAGNOSIS — N61.1 BREAST ABSCESS: Primary | ICD-10-CM

## 2021-10-18 DIAGNOSIS — N95.1 MENOPAUSAL SYMPTOM: ICD-10-CM

## 2021-10-18 PROCEDURE — 99214 OFFICE O/P EST MOD 30 MIN: CPT | Performed by: OBSTETRICS & GYNECOLOGY

## 2021-10-18 RX ORDER — OXYBUTYNIN CHLORIDE 10 MG/1
10 TABLET, EXTENDED RELEASE ORAL DAILY
Qty: 30 TABLET | Refills: 3 | Status: SHIPPED | OUTPATIENT
Start: 2021-10-18 | End: 2022-02-02

## 2021-10-18 RX ORDER — ESTRADIOL 0.5 MG/1
0.5 TABLET ORAL DAILY
Qty: 21 TABLET | Refills: 11 | Status: SHIPPED | OUTPATIENT
Start: 2021-10-18

## 2021-10-18 NOTE — PROGRESS NOTES
10/18/21    Gwendolyn Evnas  1985    Chief Complaint   Patient presents with    Follow-up     pt here for f/u on left breast pain and abscess. states the swelling has went down, continues to have left breast lump . states finished bactrim. Gwendolyn Evans is a 43 y.o. female who presents today for evaluation of as above. She also complains of menopausal symptoms with hot flashes and night sweats. Also complains of mixed incontinence symptoms with urgency seeming to be the predominant one.     Past Medical History:   Diagnosis Date    Anxiety     Asthma     [facility]     Bipolar 2 disorder ([facility]     Breast abscess     Breast pain, left     [facility]    colon- carcinoid tumor in colon and Appendix    Colon tumor     Ectopic pregnancy     Elective      GERD (gastroesophageal reflux disease)     Hot flashes     Hypertension     IBS (irritable bowel syndrome)     Kidney stone     Kidney stone     Menorrhagia     Migraine     Neck pain     Night sweat     Obesity     Peptic ulcer disease     Trichomonosis     Vaginal bleeding        Past Surgical History:   Procedure Laterality Date    APPENDECTOMY      HYSTERECTOMY, TOTAL ABDOMINAL      KIDNEY STONE SURGERY      OVARY REMOVAL      SALPINGECTOMY      right    SUBTOTAL COLECTOMY      WRIST GANGLION EXCISION         Social History     Tobacco Use    Smoking status: Passive Smoke Exposure - Never Smoker    Smokeless tobacco: Never Used   Vaping Use    Vaping Use: Never used   Substance Use Topics    Alcohol use: Yes     Comment: coffee 1/day; ETOH oncre monthly    Drug use: No       Family History   Problem Relation Age of Onset    Arthritis Mother     Asthma Mother     Heart Failure Mother     High Cholesterol Mother     High Blood Pressure Mother     Migraines Mother     Cancer Mother         possible lung but not sure    Heart Attack Mother     Alcohol Abuse Mother    Ring Depression Mother     Mental Illness Mother     Arthritis Maternal Grandmother     High Blood Pressure Maternal Grandmother     Breast Cancer Maternal Grandmother     Cancer Maternal Grandmother         brest, lung and bone    Diabetes Father     Schizophrenia Sister     Bipolar Disorder Daughter     Asthma Daughter     Asthma Son     ADHD Son         oppositional defiant disorder    Asthma Son     No Known Problems Grandchild     Alcohol Abuse Paternal Grandmother     Stroke Neg Hx        Current Outpatient Medications   Medication Sig Dispense Refill    estradiol (ESTRACE) 0.5 MG tablet Take 1 tablet by mouth daily 21 tablet 11    oxybutynin (DITROPAN XL) 10 MG extended release tablet Take 1 tablet by mouth daily 30 tablet 3    metroNIDAZOLE (FLAGYL) 500 MG tablet Take 1 tablet by mouth 2 times daily for 7 days 14 tablet 0    sulfamethoxazole-trimethoprim (BACTRIM DS) 800-160 MG per tablet Take 1 tablet by mouth 2 times daily for 10 days 20 tablet 0    MULTIPLE VITAMINS-CALCIUM PO Take by mouth daily      beclomethasone (QVAR REDIHALER) 40 MCG/ACT AERB inhaler Qvar RediHaler 40 mcg/actuation HFA breath activated aerosol      azithromycin (ZITHROMAX) 250 MG tablet azithromycin 250 mg tablet      azithromycin (ZITHROMAX) 500 MG tablet azithromycin 500 mg tablet      cefUROXime (CEFTIN) 500 MG tablet One twice daily with food      clarithromycin (BIAXIN) 500 MG tablet clarithromycin 500 mg tablet      fluconazole (DIFLUCAN) 150 MG tablet fluconazole 150 mg tablet      metroNIDAZOLE (FLAGYL) 500 MG tablet metronidazole 500 mg tablet      nitrofurantoin, macrocrystal-monohydrate, (MACROBID) 100 MG capsule nitrofurantoin monohydrate/macrocrystals 100 mg capsule      penicillin v potassium (VEETID) 500 MG tablet penicillin V potassium 500 mg tablet      sulfamethoxazole-trimethoprim (BACTRIM DS;SEPTRA DS) 800-160 MG per tablet sulfamethoxazole 800 mg-trimethoprim 160 mg tablet      ARIPiprazole (ABILIFY) 2 MG tablet aripiprazole 2 mg tablet      hydrOXYzine (ATARAX) 25 MG tablet hydroxyzine HCl 25 mg tablet      traZODone (DESYREL) 50 MG tablet take 1 tablet by mouth at bedtime      zolpidem (AMBIEN) 5 MG tablet take 1 tablet by mouth at bedtime if needed      predniSONE (DELTASONE) 20 MG tablet prednisone 20 mg tablet      oxybutynin (DITROPAN) 5 MG tablet Take 5 mg by mouth every 8 hours as needed      tolterodine (DETROL LA) 2 MG extended release capsule Take 2 mg by mouth daily      clonazePAM (KLONOPIN) 0.5 MG tablet Take 0.5 mg by mouth nightly.  lamoTRIgine (LAMICTAL) 100 MG tablet lamotrigine 100 mg tablet      acetaminophen-codeine (TYLENOL #3) 300-30 MG per tablet acetaminophen 300 mg-codeine 30 mg tablet      HYDROcodone-acetaminophen (NORCO) 7.5-325 MG per tablet hydrocodone 7.5 mg-acetaminophen 325 mg tablet      ibuprofen (ADVIL;MOTRIN) 600 MG tablet ibuprofen 600 mg tablet      SUMAtriptan (IMITREX) 25 MG tablet take 1 tablet by mouth twice a day      amLODIPine (NORVASC) 5 MG tablet amlodipine 5 mg tablet      propranolol (INDERAL) 20 MG tablet Take 20 mg by mouth 2 times daily      PYLERA 140-125-125 MG per capsule       naproxen (NAPROSYN) 250 MG tablet Take 1 tablet by mouth 2 times daily as needed for Pain 20 tablet 0    ferrous sulfate (FE TABS) 325 (65 Fe) MG EC tablet Take 1 tablet by mouth 2 times daily 60 tablet 2    hydrochlorothiazide (HYDRODIURIL) 25 MG tablet take 1 tablet by mouth once daily 30 tablet 5    pantoprazole (PROTONIX) 40 MG tablet take 1 tablet by mouth once daily 30 tablet 5    albuterol sulfate HFA (PROAIR HFA) 108 (90 Base) MCG/ACT inhaler Inhale 2 puffs into the lungs every 6 hours as needed for Wheezing 1 Inhaler 3     No current facility-administered medications for this visit.        Allergies   Allergen Reactions    Red Dye Rash    Triptans Itching    Allyl Isothiocyanate Hives and Swelling    Levonorgestrel-Ethinyl Meenakshi Quick        [id number]    Immunization History   Administered Date(s) Administered    Pneumococcal Polysaccharide ([id number]) 10/20/2017    Tdap (Boostrix, Adacel) 10/20/2017       Review of Systems   All other systems reviewed and are negative. BP (!) 132/90   Pulse 100   Ht 5' 5\" (1.651 m)   Wt 277 lb (125.6 kg)   LMP 12/13/2017   BMI 46.10 kg/m²     Physical Exam    No results found for this visit on 10/18/21. ASSESSMENT AND PLAN   Diagnosis Orders   1. Breast abscess     2. Menopausal symptom  estradiol (ESTRACE) 0.5 MG tablet   3. Mixed incontinence  oxybutynin (DITROPAN XL) 10 MG extended release tablet   The patient will monitor breast symptoms call or return if they worsen. She will continue with warm compresses. The region is much less indurated and the erythema is gone. Prescriptions as above written for the menopausal symptoms of muscular incontinence. She will follow up in 6 weeks for recheck of all of the above problems. Return in about 6 weeks (around 11/29/2021) for follow up appointment.     Clarisa Paris MD

## 2022-02-02 ENCOUNTER — HOSPITAL ENCOUNTER (OUTPATIENT)
Dept: INFUSION THERAPY | Age: 37
Discharge: HOME | End: 2022-02-02
Payer: COMMERCIAL

## 2022-02-02 ENCOUNTER — OFFICE VISIT (OUTPATIENT)
Dept: ONCOLOGY | Age: 37
End: 2022-02-02
Payer: COMMERCIAL

## 2022-02-02 ENCOUNTER — TELEPHONE (OUTPATIENT)
Dept: ONCOLOGY | Age: 37
End: 2022-02-02

## 2022-02-02 VITALS
SYSTOLIC BLOOD PRESSURE: 134 MMHG | OXYGEN SATURATION: 99 % | HEIGHT: 65 IN | WEIGHT: 269 LBS | BODY MASS INDEX: 44.82 KG/M2 | HEART RATE: 101 BPM | DIASTOLIC BLOOD PRESSURE: 85 MMHG | TEMPERATURE: 97.3 F | RESPIRATION RATE: 16 BRPM

## 2022-02-02 DIAGNOSIS — C7A.020 MALIGNANT CARCINOID TUMOR OF APPENDIX (HCC): ICD-10-CM

## 2022-02-02 PROBLEM — D3A.020 CARCINOID TUMOR OF APPENDIX: Status: ACTIVE | Noted: 2022-02-02

## 2022-02-02 LAB
ALBUMIN SERPL-MCNC: 3.8 GM/DL (ref 3.4–5)
ALP BLD-CCNC: 63 IU/L (ref 40–129)
ALT SERPL-CCNC: 13 U/L (ref 10–40)
ANION GAP SERPL CALCULATED.3IONS-SCNC: 9 MMOL/L (ref 4–16)
AST SERPL-CCNC: 15 IU/L (ref 15–37)
BASOPHILS ABSOLUTE: 0 K/CU MM
BASOPHILS RELATIVE PERCENT: 0.2 % (ref 0–1)
BILIRUB SERPL-MCNC: 0.3 MG/DL (ref 0–1)
BUN BLDV-MCNC: 8 MG/DL (ref 6–23)
CALCIUM SERPL-MCNC: 9.5 MG/DL (ref 8.3–10.6)
CHLORIDE BLD-SCNC: 104 MMOL/L (ref 99–110)
CO2: 23 MMOL/L (ref 21–32)
CREAT SERPL-MCNC: 0.6 MG/DL (ref 0.6–1.1)
DIFFERENTIAL TYPE: ABNORMAL
EOSINOPHILS ABSOLUTE: 0.1 K/CU MM
EOSINOPHILS RELATIVE PERCENT: 1.3 % (ref 0–3)
GFR AFRICAN AMERICAN: >60 ML/MIN/1.73M2
GFR NON-AFRICAN AMERICAN: >60 ML/MIN/1.73M2
GLUCOSE BLD-MCNC: 111 MG/DL (ref 70–99)
HCT VFR BLD CALC: 38.9 % (ref 37–47)
HEMOGLOBIN: 13 GM/DL (ref 12.5–16)
LYMPHOCYTES ABSOLUTE: 1.2 K/CU MM
LYMPHOCYTES RELATIVE PERCENT: 19.8 % (ref 24–44)
MCH RBC QN AUTO: 29 PG (ref 27–31)
MCHC RBC AUTO-ENTMCNC: 33.4 % (ref 32–36)
MCV RBC AUTO: 86.6 FL (ref 78–100)
MONOCYTES ABSOLUTE: 0.3 K/CU MM
MONOCYTES RELATIVE PERCENT: 4.2 % (ref 0–4)
PDW BLD-RTO: 13.1 % (ref 11.7–14.9)
PLATELET # BLD: 269 K/CU MM (ref 140–440)
PMV BLD AUTO: 8.2 FL (ref 7.5–11.1)
POTASSIUM SERPL-SCNC: 4.2 MMOL/L (ref 3.5–5.1)
RBC # BLD: 4.49 M/CU MM (ref 4.2–5.4)
SEGMENTED NEUTROPHILS ABSOLUTE COUNT: 4.7 K/CU MM
SEGMENTED NEUTROPHILS RELATIVE PERCENT: 74.5 % (ref 36–66)
SODIUM BLD-SCNC: 136 MMOL/L (ref 135–145)
TOTAL PROTEIN: 7.3 GM/DL (ref 6.4–8.2)
WBC # BLD: 6.3 K/CU MM (ref 4–10.5)

## 2022-02-02 PROCEDURE — 99214 OFFICE O/P EST MOD 30 MIN: CPT | Performed by: INTERNAL MEDICINE

## 2022-02-02 PROCEDURE — G8427 DOCREV CUR MEDS BY ELIG CLIN: HCPCS | Performed by: INTERNAL MEDICINE

## 2022-02-02 PROCEDURE — 85025 COMPLETE CBC W/AUTO DIFF WBC: CPT

## 2022-02-02 PROCEDURE — G8417 CALC BMI ABV UP PARAM F/U: HCPCS | Performed by: INTERNAL MEDICINE

## 2022-02-02 PROCEDURE — 80053 COMPREHEN METABOLIC PANEL: CPT

## 2022-02-02 PROCEDURE — 86316 IMMUNOASSAY TUMOR OTHER: CPT

## 2022-02-02 PROCEDURE — 36415 COLL VENOUS BLD VENIPUNCTURE: CPT

## 2022-02-02 PROCEDURE — 1036F TOBACCO NON-USER: CPT | Performed by: INTERNAL MEDICINE

## 2022-02-02 PROCEDURE — G8484 FLU IMMUNIZE NO ADMIN: HCPCS | Performed by: INTERNAL MEDICINE

## 2022-02-02 ASSESSMENT — PATIENT HEALTH QUESTIONNAIRE - PHQ9
SUM OF ALL RESPONSES TO PHQ QUESTIONS 1-9: 0
SUM OF ALL RESPONSES TO PHQ QUESTIONS 1-9: 0
1. LITTLE INTEREST OR PLEASURE IN DOING THINGS: 0
SUM OF ALL RESPONSES TO PHQ QUESTIONS 1-9: 0
SUM OF ALL RESPONSES TO PHQ QUESTIONS 1-9: 0
SUM OF ALL RESPONSES TO PHQ9 QUESTIONS 1 & 2: 0
2. FEELING DOWN, DEPRESSED OR HOPELESS: 0

## 2022-02-02 NOTE — PROGRESS NOTES
MA Rooming Questions  Patient: Anaya Montes De Oca  MRN: [id number]    Date: 2/2/2022        1. Do you have any new issues?   no         2. Do you need any refills on medications?    no    3. Have you had any imaging done since your last visit?   no    4. Have you been hospitalized or seen in the emergency room since your last visit here?   no    5. Did the patient have a depression screening completed today?  Yes    PHQ-9 Total Score: 0 (2/2/2022 11:38 AM)       PHQ-9 Given to (if applicable):               PHQ-9 Score (if applicable):                     [] Positive     []  Negative              Does question #9 need addressed (if applicable)                     [] Yes    []  No               Zorita Seats, CMA
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
## 2022-02-02 NOTE — TELEPHONE ENCOUNTER
Called patient advising them of their CT CHEST/ABD/PELVIS, patient is scheduled for 02/21/2022 at [facility] facility @ 0830 arrival for 1000 test, NPO 4 hrs prior to test, patient states understanding

## 2022-02-06 ENCOUNTER — HOSPITAL ENCOUNTER (OUTPATIENT)
Age: 37
Setting detail: SPECIMEN
Discharge: HOME | End: 2022-02-06
Payer: COMMERCIAL

## 2022-02-06 PROCEDURE — 81050 URINALYSIS VOLUME MEASURE: CPT

## 2022-02-06 PROCEDURE — 83497 ASSAY OF 5-HIAA: CPT

## 2022-02-07 LAB
Lab: 24 HRS
VOLUME, (UVOL): 1275 MLS

## 2022-02-09 LAB
5 HIAA URINE (PER GM CREAT): 2 MG/GCR (ref 0–14)
5-HIAA 24 HOUR URINE: 4 MG/D (ref 0–15)
5-HIAA INTERPRETATION: ABNORMAL
5-HIAA URINE: 3.1 MG/L
CREATININE URINE: 133 MG/DL
CREATININE, 24H UR: 1696 MG/D (ref 700–1600)
TIME URINE: 24
VOLUME, (UVOL): 1275

## 2022-02-10 ENCOUNTER — OFFICE VISIT (OUTPATIENT)
Dept: OBGYN | Age: 37
End: 2022-02-10
Payer: COMMERCIAL

## 2022-02-10 VITALS
DIASTOLIC BLOOD PRESSURE: 88 MMHG | BODY MASS INDEX: 45.48 KG/M2 | HEIGHT: 65 IN | WEIGHT: 273 LBS | SYSTOLIC BLOOD PRESSURE: 131 MMHG

## 2022-02-10 DIAGNOSIS — R10.2 PELVIC PAIN: ICD-10-CM

## 2022-02-10 DIAGNOSIS — N92.6 IRREGULAR MENSES: Primary | ICD-10-CM

## 2022-02-10 PROCEDURE — 99214 OFFICE O/P EST MOD 30 MIN: CPT | Performed by: OBSTETRICS & GYNECOLOGY

## 2022-02-10 PROCEDURE — G8427 DOCREV CUR MEDS BY ELIG CLIN: HCPCS | Performed by: OBSTETRICS & GYNECOLOGY

## 2022-02-10 PROCEDURE — 1036F TOBACCO NON-USER: CPT | Performed by: OBSTETRICS & GYNECOLOGY

## 2022-02-10 PROCEDURE — G8484 FLU IMMUNIZE NO ADMIN: HCPCS | Performed by: OBSTETRICS & GYNECOLOGY

## 2022-02-10 PROCEDURE — G8417 CALC BMI ABV UP PARAM F/U: HCPCS | Performed by: OBSTETRICS & GYNECOLOGY

## 2022-02-14 NOTE — PROGRESS NOTES
22    Brooklyn Roman  1985    Chief Complaint   Patient presents with    Vaginal Bleeding     pt c/o vaginal bleeding x 2 wks, bright and dark red, small clots. happens on and off x 1 1/2 yrs.  Pelvic Pain     pt c/o pelvic pain and pressure x 1 1/2 wks, sharp , radiates into left leg and lower back, lifting worsens pain. Brooklyn Roman is a 43 y.o. female who presents today for evaluation of her pelvic pain and increasingly abnormal bleeding and pain. This is been a chronic problem.     Past Medical History:   Diagnosis Date    Abnormal uterine bleeding (AUB)     Anxiety     Asthma     Bipolar 1 disorder (HCC)     Bipolar 2 disorder (HCC)     Breast abscess     Breast pain, left     Cancer ([facility] [address]     colon- carcinoid tumor in colon and Appendix    Colon tumor     Ectopic pregnancy     Elective      GERD (gastroesophageal reflux disease)     Hot flashes     Hypertension     IBS (irritable bowel syndrome)     Kidney stone     Kidney stone     Menopausal symptoms     Menorrhagia     Migraine     Neck pain     Night sweat     Obesity     Pelvic pain     Pelvic pressure in female     Peptic ulcer disease     Trichomonosis     Vaginal bleeding        Past Surgical History:   Procedure Laterality Date    APPENDECTOMY      HYSTERECTOMY, TOTAL ABDOMINAL      KIDNEY STONE SURGERY      OVARY REMOVAL      SALPINGECTOMY      right    SUBTOTAL COLECTOMY      WRIST GANGLION EXCISION         Social History     Tobacco Use    Smoking status: Never Smoker    Smokeless tobacco: Never Used   Vaping Use    Vaping Use: Never used   Substance Use Topics    Alcohol use: Not Currently    Drug use: No       Family History   Problem Relation Age of Onset    Arthritis Mother     Asthma Mother     Heart Failure Mother     High Cholesterol Mother     High Blood Pressure Mother     Migraines Mother     Cancer Mother         possible lung but not sure    Heart Attack Mother     Alcohol Abuse Mother     Depression Mother     Mental Illness Mother     Arthritis Maternal Grandmother     High Blood Pressure Maternal Grandmother     Breast Cancer Maternal Grandmother     Cancer Maternal Grandmother         brest, lung and bone    Diabetes Father     Schizophrenia Sister     Bipolar Disorder Daughter     Asthma Daughter     Asthma Son     ADHD Son         oppositional defiant disorder    Asthma Son     No Known Problems Grandchild     Alcohol Abuse Paternal Grandmother     Stroke Neg Hx        Current Outpatient Medications   Medication Sig Dispense Refill    estradiol (ESTRACE) 0.5 MG tablet Take 1 tablet by mouth daily 21 tablet 11    beclomethasone (QVAR REDIHALER) 40 MCG/ACT AERB inhaler Qvar RediHaler 40 mcg/actuation HFA breath activated aerosol      hydrOXYzine (ATARAX) 25 MG tablet hydroxyzine HCl 25 mg tablet      lamoTRIgine (LAMICTAL) 100 MG tablet lamotrigine 100 mg tablet      amLODIPine (NORVASC) 5 MG tablet amlodipine 5 mg tablet      albuterol sulfate HFA (PROAIR HFA) 108 (90 Base) MCG/ACT inhaler Inhale 2 puffs into the lungs every 6 hours as needed for Wheezing 1 Inhaler 3    fluconazole (DIFLUCAN) 150 MG tablet fluconazole 150 mg tablet      predniSONE (DELTASONE) 20 MG tablet prednisone 20 mg tablet      ibuprofen (ADVIL;MOTRIN) 600 MG tablet ibuprofen 600 mg tablet      propranolol (INDERAL) 20 MG tablet Take 20 mg by mouth 2 times daily      naproxen (NAPROSYN) 250 MG tablet Take 1 tablet by mouth 2 times daily as needed for Pain 20 tablet 0     No current facility-administered medications for this visit.        Allergies   Allergen Reactions    Red Dye Rash    Triptans Itching    Allyl Isothiocyanate Hives and Swelling    Levonorgestrel-Ethinyl Tariq White        [id number]    Immunization History   Administered Date(s) Administered    Pneumococcal Polysaccharide ([id number]) 10/20/2017    Tdap (Boostrix, Adacel) 10/20/2017 Review of Systems  All other systems reviewed and are negative    /88   Ht 5' 5\" (1.651 m)   Wt 273 lb (123.8 kg)   LMP 12/13/2017   BMI 45.43 kg/m²     Physical Exam    No results found for this visit on 02/10/22. ASSESSMENT AND PLAN   Diagnosis Orders   1. Irregular menses     2. Pelvic pain     The patient will schedule an ultrasound and a follow-up visit. She will use Tylenol over-the-counter Motrin for pain. She will call or return sooner with problems. Return for ultrasound, follow up appointment.     Kathy Booth MD

## 2022-02-15 ENCOUNTER — HOSPITAL ENCOUNTER (OUTPATIENT)
Dept: INFUSION THERAPY | Age: 37
Discharge: HOME | End: 2022-02-15
Payer: COMMERCIAL

## 2022-02-15 PROCEDURE — 86316 IMMUNOASSAY TUMOR OTHER: CPT

## 2022-02-15 PROCEDURE — 36415 COLL VENOUS BLD VENIPUNCTURE: CPT

## 2022-02-18 ENCOUNTER — OFFICE VISIT (OUTPATIENT)
Dept: PHYSICAL MEDICINE AND REHAB | Age: 37
End: 2022-02-18
Payer: COMMERCIAL

## 2022-02-18 VITALS — TEMPERATURE: 97.1 F

## 2022-02-18 DIAGNOSIS — M79.601 PARESTHESIA AND PAIN OF BOTH UPPER EXTREMITIES: ICD-10-CM

## 2022-02-18 DIAGNOSIS — R20.2 PARESTHESIA AND PAIN OF BOTH UPPER EXTREMITIES: ICD-10-CM

## 2022-02-18 DIAGNOSIS — M79.602 PARESTHESIA AND PAIN OF BOTH UPPER EXTREMITIES: ICD-10-CM

## 2022-02-18 DIAGNOSIS — G56.03 BILATERAL CARPAL TUNNEL SYNDROME: Primary | ICD-10-CM

## 2022-02-18 PROCEDURE — 95911 NRV CNDJ TEST 9-10 STUDIES: CPT | Performed by: PHYSICAL MEDICINE & REHABILITATION

## 2022-02-18 PROCEDURE — 95886 MUSC TEST DONE W/N TEST COMP: CPT | Performed by: PHYSICAL MEDICINE & REHABILITATION

## 2022-02-18 NOTE — LETTER
February 18, 2022        Wallace RamirezStephen [id number]         Patient Name: Winnie Morales   MRN Number: [id number]   YOB: 1985   Date Of Visit: 02/18/2022        Dear Wallace Ramirez CNP,      Thank you for referring Winnie Morales to me for electrodiagnostic testing. Attached are the findings of the EMG and my assessment. If you have any further questions, please do not hesitate to call me. Thank you for this interesting referral.      Sincerely,          NIMA Weems MD.

## 2022-02-18 NOTE — PROGRESS NOTES
EMG REPORT     CHIEF COMPLAINT: Bilateral hand and wrist pain. HISTORY OF PRESENT ILLNESS: 43 y.o. right hand dominant female with progressive bilateral wrist and hand pain, primarily over the last year. She has been treated for ganglion cysts at the wrist in the past with surgery. Now she has aching and throbbing in both wrist and numbness and burning in the fingers. She drops things from her  and the symptoms wake her from sleep. She rated her pain severity as 9/10. She has some neck pain without radiation into the upper limbs. She also has numbness and tingling in her feet. She denied any history of diabetes or thyroid disorder. PHYSICAL EXAMINATION: About 70 degrees of active cervical spine rotation bilaterally. Spurling's maneuver was uncomfortable but did not reproduce limb symptoms. Upper limb reflexes were trace and symmetric. Tinel's sign was negative. She had tenderness to palpation about the dorsal and lateral wrists bilaterally. Thumb opposition MMT was 4+/5. All other strength seemed normal.  There was no atrophy, tremor or clonus present. Sensation was diminished over the thumbs, thenar eminences and long fingers of each hand. NERVE CONDUCTION STUDIES:     MOTOR         LATENCY NORMAL AMPLITUDE DISTANCE COND. TARA. RIGHT  MEDIAN 4.5 < 4.2 msec 3.7 8 cm 58   LEFT  MEDIAN 4.5 < 4.2 msec 7.5 8 cm >50   RIGHT  ULNAR 2.7 < 4.2 msec 5.2 8 cm 55   LEFT  ULNAR 2.7 < 4.2 msec 5.0 8 cm >50      SENSORY  ORTHODROMIC        LATENCY NORMAL AMPLITUDE DISTANCE   RIGHT MEDIAN 3.8 <2.3 msec 8 10 cm   LEFT  MEDIAN 3.2 < 2.3 msec 14 10 cm   RIGHT  ULNAR 1.9 < 2.3 msec 8 10 cm   LEFT  ULNAR 2.0 < 2.3 msec 11 10 cm       Right dorsal ulnar sensory: dL 1.8 msec, amp 22 microV.         NEEDLE EMG:      RIGHT   LEFT     Insertional Activity Spontaneous  Activity Volutional  MUAP's Insertional Activity Spontaneous  Activity Volutional  MUAP's   Cerv Parasp Normal None Normal Normal None Normal Infraspinatus Normal None Normal Normal None Normal   Deltoid   Normal None Normal Normal None Normal   Biceps   Normal None Normal Normal None Normal   Triceps   Normal None Normal Normal None Normal   Pronator Teres Normal None Normal Normal None Normal   Extensor Indicis Normal None Normal Normal None Normal   1st Dorsal Interosseus Normal None Normal Normal None Normal   Abductor Pollicis Br. Normal None Dec #, Larger Normal None Dec #, Larger       FINDINGS:   EMG of the cervical paraspinals and both upper limbs demonstrated no paraspinal nor limb muscle membrane irritability. Motor units in the APB muscles were diminished in number and larger. All other motor units were of normal configuration and recruitment. Median sensory and motor latencies were delayed across each wrist.  The right median motor evoked amplitude was less than expected. Ulnar sensory and motor latencies, evoked amplitudes and nerve conduction velocities were normal.      IMPRESSION:      1. Abnormal EMG. 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 2.  No evidence of a concurrent cervical spinal nerve root lesion (radiculopathy), cervical plexopathy, generalized neuropathy or primary muscle disease.            Thank you for this interesting referral.

## 2022-02-19 LAB — CHROMOGRANIN A: 103 NG/ML (ref 0–103)

## 2022-02-21 ENCOUNTER — HOSPITAL ENCOUNTER (OUTPATIENT)
Dept: CT IMAGING | Age: 37
Discharge: HOME | End: 2022-02-21
Payer: COMMERCIAL

## 2022-02-21 DIAGNOSIS — C7A.020 MALIGNANT CARCINOID TUMOR OF APPENDIX (HCC): ICD-10-CM

## 2022-02-21 PROCEDURE — 2580000003 HC RX 258: Performed by: INTERNAL MEDICINE

## 2022-02-21 PROCEDURE — 6360000004 HC RX CONTRAST MEDICATION: Performed by: INTERNAL MEDICINE

## 2022-02-21 PROCEDURE — 74177 CT ABD & PELVIS W/CONTRAST: CPT

## 2022-02-21 PROCEDURE — 71260 CT THORAX DX C+: CPT

## 2022-02-21 RX ORDER — SODIUM CHLORIDE 0.9 % (FLUSH) 0.9 %
10 SYRINGE (ML) INJECTION PRN
Status: DISCONTINUED | OUTPATIENT
Start: 2022-02-21 | End: 2022-02-22 | Stop reason: HOSPADM

## 2022-02-21 RX ADMIN — IOPAMIDOL 75 ML: 755 INJECTION, SOLUTION INTRAVENOUS at 10:05

## 2022-02-21 RX ADMIN — IOHEXOL 50 ML: 240 INJECTION, SOLUTION INTRATHECAL; INTRAVASCULAR; INTRAVENOUS; ORAL at 08:55

## 2022-02-21 RX ADMIN — SODIUM CHLORIDE, PRESERVATIVE FREE 10 ML: 5 INJECTION INTRAVENOUS at 10:05

## 2022-02-23 ENCOUNTER — OFFICE VISIT (OUTPATIENT)
Dept: ONCOLOGY | Age: 37
End: 2022-02-23
Payer: COMMERCIAL

## 2022-02-23 ENCOUNTER — HOSPITAL ENCOUNTER (OUTPATIENT)
Dept: INFUSION THERAPY | Age: 37
Discharge: HOME | End: 2022-02-23

## 2022-02-23 VITALS
HEIGHT: 65 IN | TEMPERATURE: 97.1 F | HEART RATE: 89 BPM | WEIGHT: 274 LBS | RESPIRATION RATE: 16 BRPM | OXYGEN SATURATION: 99 % | SYSTOLIC BLOOD PRESSURE: 130 MMHG | BODY MASS INDEX: 45.65 KG/M2 | DIASTOLIC BLOOD PRESSURE: 77 MMHG

## 2022-02-23 DIAGNOSIS — C7A.020 MALIGNANT CARCINOID TUMOR OF APPENDIX (HCC): Primary | ICD-10-CM

## 2022-02-23 PROCEDURE — 1036F TOBACCO NON-USER: CPT | Performed by: INTERNAL MEDICINE

## 2022-02-23 PROCEDURE — G8417 CALC BMI ABV UP PARAM F/U: HCPCS | Performed by: INTERNAL MEDICINE

## 2022-02-23 PROCEDURE — G8484 FLU IMMUNIZE NO ADMIN: HCPCS | Performed by: INTERNAL MEDICINE

## 2022-02-23 PROCEDURE — 99214 OFFICE O/P EST MOD 30 MIN: CPT | Performed by: INTERNAL MEDICINE

## 2022-02-23 PROCEDURE — G8427 DOCREV CUR MEDS BY ELIG CLIN: HCPCS | Performed by: INTERNAL MEDICINE
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
## 2022-02-23 NOTE — PROGRESS NOTES
MA Rooming Questions  Patient: Leatha Shipman  MRN: [id number]    Date: 2/23/2022        1. Do you have any new issues? yes - Pt c/o pain in breast bone         2. Do you need any refills on medications?    no    3. Have you had any imaging done since your last visit? yes - CT 2/21    4. Have you been hospitalized or seen in the emergency room since your last visit here?   no    5. Did the patient have a depression screening completed today?  No    No data recorded     PHQ-9 Given to (if applicable):               PHQ-9 Score (if applicable):                     [] Positive     []  Negative              Does question #9 need addressed (if applicable)                     [] Yes    []  No               Adamaris Veloz MA

## 2022-02-24 ENCOUNTER — OFFICE VISIT (OUTPATIENT)
Dept: OBGYN | Age: 37
End: 2022-02-24
Payer: COMMERCIAL

## 2022-02-24 VITALS
DIASTOLIC BLOOD PRESSURE: 81 MMHG | WEIGHT: 273 LBS | SYSTOLIC BLOOD PRESSURE: 133 MMHG | BODY MASS INDEX: 45.48 KG/M2 | HEIGHT: 65 IN

## 2022-02-24 DIAGNOSIS — N93.9 VAGINAL BLEEDING: Primary | ICD-10-CM

## 2022-02-24 DIAGNOSIS — N95.1 MENOPAUSAL SYMPTOM: ICD-10-CM

## 2022-02-24 PROCEDURE — 99213 OFFICE O/P EST LOW 20 MIN: CPT | Performed by: OBSTETRICS & GYNECOLOGY

## 2022-02-24 PROCEDURE — G8427 DOCREV CUR MEDS BY ELIG CLIN: HCPCS | Performed by: OBSTETRICS & GYNECOLOGY

## 2022-02-24 PROCEDURE — G8417 CALC BMI ABV UP PARAM F/U: HCPCS | Performed by: OBSTETRICS & GYNECOLOGY

## 2022-02-24 PROCEDURE — 1036F TOBACCO NON-USER: CPT | Performed by: OBSTETRICS & GYNECOLOGY

## 2022-02-24 PROCEDURE — G8484 FLU IMMUNIZE NO ADMIN: HCPCS | Performed by: OBSTETRICS & GYNECOLOGY

## 2022-02-24 RX ORDER — ESTRADIOL 0.1 MG/G
1 CREAM VAGINAL
Qty: 1 EACH | Refills: 3 | Status: SHIPPED | OUTPATIENT
Start: 2022-02-24

## 2022-02-24 SDOH — ECONOMIC STABILITY: TRANSPORTATION INSECURITY
IN THE PAST 12 MONTHS, HAS THE LACK OF TRANSPORTATION KEPT YOU FROM MEDICAL APPOINTMENTS OR FROM GETTING MEDICATIONS?: NO

## 2022-02-24 SDOH — ECONOMIC STABILITY: HOUSING INSECURITY: IN THE LAST 12 MONTHS, HOW MANY PLACES HAVE YOU LIVED?: 1

## 2022-02-24 SDOH — ECONOMIC STABILITY: INCOME INSECURITY: IN THE LAST 12 MONTHS, WAS THERE A TIME WHEN YOU WERE NOT ABLE TO PAY THE MORTGAGE OR RENT ON TIME?: NO

## 2022-02-24 SDOH — ECONOMIC STABILITY: HOUSING INSECURITY
IN THE LAST 12 MONTHS, WAS THERE A TIME WHEN YOU DID NOT HAVE A STEADY PLACE TO SLEEP OR SLEPT IN A SHELTER (INCLUDING NOW)?: NO

## 2022-02-24 SDOH — ECONOMIC STABILITY: TRANSPORTATION INSECURITY
IN THE PAST 12 MONTHS, HAS LACK OF TRANSPORTATION KEPT YOU FROM MEETINGS, WORK, OR FROM GETTING THINGS NEEDED FOR DAILY LIVING?: NO

## 2022-02-24 NOTE — PROGRESS NOTES
22    Roberto Rojas  1985    Chief Complaint   Patient presents with    Irregular Menses     Pt here to discuss ultrasound results due to irregular bleeding. Pt states she is currently still having light bleeding. Roberto Rojas is a 43 y.o. female who presents today for evaluation of vaginal bleeding post hysterectomy which the patient states is getting slightly better.     Past Medical History:   Diagnosis Date    Abnormal uterine bleeding (AUB)     Anxiety     Asthma     Bipolar 1 disorder (HCC)     Bipolar 2 disorder (HCC)     Breast abscess     Breast pain, left     Cancer ([facility] [address]     colon- carcinoid tumor in colon and Appendix    Colon tumor     Ectopic pregnancy     Elective      GERD (gastroesophageal reflux disease)     Hot flashes     Hypertension     IBS (irritable bowel syndrome)     Kidney stone     Kidney stone     Menopausal symptoms     Menorrhagia     Migraine     Neck pain     Night sweat     Obesity     Pelvic pain     Pelvic pressure in female     Peptic ulcer disease     Trichomonosis     Vaginal bleeding        Past Surgical History:   Procedure Laterality Date    APPENDECTOMY      HYSTERECTOMY, TOTAL ABDOMINAL      KIDNEY STONE SURGERY      OVARY REMOVAL      SALPINGECTOMY      right    SUBTOTAL COLECTOMY      WRIST GANGLION EXCISION         Social History     Tobacco Use    Smoking status: Never Smoker    Smokeless tobacco: Never Used   Vaping Use    Vaping Use: Never used   Substance Use Topics    Alcohol use: Not Currently    Drug use: No       Family History   Problem Relation Age of Onset    Arthritis Mother     Asthma Mother     Heart Failure Mother     High Cholesterol Mother     High Blood Pressure Mother     Migraines Mother     Cancer Mother         possible lung but not sure    Heart Attack Mother     Alcohol Abuse Mother     Depression Mother     Mental Illness Mother     Arthritis Maternal Grandmother     High Blood Pressure Maternal Grandmother     Breast Cancer Maternal Grandmother     Cancer Maternal Grandmother         brest, lung and bone    Diabetes Father     Schizophrenia Sister     Bipolar Disorder Daughter     Asthma Daughter     Asthma Son     ADHD Son         oppositional defiant disorder    Asthma Son     No Known Problems Grandchild     Alcohol Abuse Paternal Grandmother     Stroke Neg Hx        Current Outpatient Medications   Medication Sig Dispense Refill    estradiol (ESTRACE VAGINAL) 0.1 MG/GM vaginal cream Place 1 g vaginally twice a week 1 each 3    estradiol (ESTRACE) 0.5 MG tablet Take 1 tablet by mouth daily 21 tablet 11    beclomethasone (QVAR REDIHALER) 40 MCG/ACT AERB inhaler Qvar RediHaler 40 mcg/actuation HFA breath activated aerosol      hydrOXYzine (ATARAX) 25 MG tablet hydroxyzine HCl 25 mg tablet      lamoTRIgine (LAMICTAL) 100 MG tablet lamotrigine 100 mg tablet      amLODIPine (NORVASC) 5 MG tablet amlodipine 5 mg tablet      propranolol (INDERAL) 20 MG tablet Take 20 mg by mouth 2 times daily      naproxen (NAPROSYN) 250 MG tablet Take 1 tablet by mouth 2 times daily as needed for Pain 20 tablet 0    albuterol sulfate HFA (PROAIR HFA) 108 (90 Base) MCG/ACT inhaler Inhale 2 puffs into the lungs every 6 hours as needed for Wheezing 1 Inhaler 3     No current facility-administered medications for this visit.        Allergies   Allergen Reactions    Red Dye Rash    Triptans Itching    Allyl Isothiocyanate Hives and Swelling    Levonorgestrel-Ethinyl Ryley Ashlee        [id number]    Immunization History   Administered Date(s) Administered    Pneumococcal Polysaccharide ([id number]) 10/20/2017    Tdap (Boostrix, Adacel) 10/20/2017       Review of Systems  All other systems reviewed and are negative    /81   Ht 5' 5\" (1.651 m)   Wt 273 lb (123.8 kg)   LMP 12/13/2017   BMI 45.43 kg/m²     Physical Exam    No results found for this visit on

## 2023-12-21 NOTE — TELEPHONE ENCOUNTER
----- Message from KIMBERLYN Velasco CNP sent at 3/3/2020  3:03 PM EST -----  Please check with patient    Is she still coming here? Of so, needs an appt.     If not, let me know, I can take her off of my profile    Thank you
Admission